# Patient Record
Sex: MALE | Race: WHITE | Employment: FULL TIME | URBAN - METROPOLITAN AREA
[De-identification: names, ages, dates, MRNs, and addresses within clinical notes are randomized per-mention and may not be internally consistent; named-entity substitution may affect disease eponyms.]

---

## 2023-12-11 NOTE — PROGRESS NOTES
Judgment: Judgment normal.       Assessment:      Diagnosis Orders   1. SOB (shortness of breath)  Full PFT Study With Bronchodilator      2. Preventative health care  PSA Screening    CBC with Auto Differential    Comprehensive Metabolic Panel    Hemoglobin A1C    Lipid Panel      3. Colon cancer screening  Fecal DNA Colorectal cancer screening (Cologuard)      4. Obstructive sleep apnea        5. Gastroesophageal reflux disease without esophagitis        6. Hyperlipidemia, unspecified hyperlipidemia type        7. Concentration deficit          Plan:     Colon cancer screening  Discussed colonoscopy and cologuard, pt interested in cologuard. - Fecal DNA Colorectal cancer screening (Cologuard)    SOB (shortness of breath)  - will get PFT to further evaluate and rule out asthma. - cough and deep breath exercises. - Avoid triggers that may exacerbate symptoms. Obstructive sleep apnea  - pt states stable, is not using cpap at night. Gastroesophageal reflux disease without esophagitis  - Stable: con't current tx plan  - Patient educated on avoiding trigger food/drinks such as caffeine, soda, chocolate, greasy/fatty, spicy foods.  - Sleep with head of bed elevated, avoid lying flat after eating and avoid restrictive clothing around waist.    Hyperlipidemia, unspecified hyperlipidemia type  - due for labs. - Lifestyle changes: Decrease fats, sugars, carbohydrates, and increase routine exercise, try to get 150 minutes of aerobic activity a week  - Foods that helps increase HDL include the following: Fish, nuts, flax, avocados, and legumes (such as soy, kidney beans, chickpeas). - Will cont with low fat/chol die    Concentration deficit  - discussed with pt, they will need to get official diagnosis by psychiatrist or psychologist. Once they have the official diagnosis and bring paperwork to our office to be scanned into media, then treatment can be discussed and started. - pt verbalized understanding.

## 2023-12-12 SDOH — HEALTH STABILITY: PHYSICAL HEALTH: ON AVERAGE, HOW MANY MINUTES DO YOU ENGAGE IN EXERCISE AT THIS LEVEL?: 60 MIN

## 2023-12-12 SDOH — HEALTH STABILITY: PHYSICAL HEALTH: ON AVERAGE, HOW MANY DAYS PER WEEK DO YOU ENGAGE IN MODERATE TO STRENUOUS EXERCISE (LIKE A BRISK WALK)?: 6 DAYS

## 2023-12-13 ENCOUNTER — OFFICE VISIT (OUTPATIENT)
Dept: FAMILY MEDICINE CLINIC | Age: 48
End: 2023-12-13
Payer: COMMERCIAL

## 2023-12-13 ENCOUNTER — HOSPITAL ENCOUNTER (OUTPATIENT)
Age: 48
Setting detail: SPECIMEN
Discharge: HOME | End: 2023-12-13

## 2023-12-13 VITALS
WEIGHT: 201 LBS | DIASTOLIC BLOOD PRESSURE: 78 MMHG | TEMPERATURE: 97.4 F | HEIGHT: 74 IN | SYSTOLIC BLOOD PRESSURE: 122 MMHG | HEART RATE: 62 BPM | BODY MASS INDEX: 25.8 KG/M2 | RESPIRATION RATE: 16 BRPM | OXYGEN SATURATION: 96 %

## 2023-12-13 DIAGNOSIS — E78.5 HYPERLIPIDEMIA, UNSPECIFIED HYPERLIPIDEMIA TYPE: ICD-10-CM

## 2023-12-13 DIAGNOSIS — Z00.00 PREVENTATIVE HEALTH CARE: ICD-10-CM

## 2023-12-13 DIAGNOSIS — G47.33 OBSTRUCTIVE SLEEP APNEA: ICD-10-CM

## 2023-12-13 DIAGNOSIS — R41.840 CONCENTRATION DEFICIT: ICD-10-CM

## 2023-12-13 DIAGNOSIS — K21.9 GASTROESOPHAGEAL REFLUX DISEASE WITHOUT ESOPHAGITIS: ICD-10-CM

## 2023-12-13 DIAGNOSIS — R06.02 SOB (SHORTNESS OF BREATH): Primary | ICD-10-CM

## 2023-12-13 DIAGNOSIS — Z12.11 COLON CANCER SCREENING: ICD-10-CM

## 2023-12-13 PROBLEM — M99.01 CERVICAL SEGMENT DYSFUNCTION: Status: ACTIVE | Noted: 2022-10-10

## 2023-12-13 PROBLEM — M99.04 SOMATIC DYSFUNCTION OF SACRAL REGION: Status: ACTIVE | Noted: 2022-10-10

## 2023-12-13 PROBLEM — K59.89 INTESTINAL DYSBIOSIS: Status: ACTIVE | Noted: 2022-12-13

## 2023-12-13 PROBLEM — R74.8 HIGH GAMMA GLUTAMYL TRANSFERASE (GGT): Status: ACTIVE | Noted: 2022-12-13

## 2023-12-13 PROBLEM — E63.0 ESSENTIAL FATTY ACID (EFA) DEFICIENCY: Status: ACTIVE | Noted: 2022-12-13

## 2023-12-13 PROBLEM — K40.90 REDUCIBLE RIGHT INGUINAL HERNIA: Status: RESOLVED | Noted: 2017-10-27 | Resolved: 2023-12-13

## 2023-12-13 PROBLEM — K40.90 REDUCIBLE RIGHT INGUINAL HERNIA: Status: ACTIVE | Noted: 2017-10-27

## 2023-12-13 LAB
BASOPHILS # BLD: 0.06 K/UL (ref 0–0.2)
BASOPHILS NFR BLD: 1 % (ref 0–2)
EOSINOPHIL # BLD: 0.32 K/UL (ref 0–0.44)
EOSINOPHILS RELATIVE PERCENT: 5 % (ref 1–4)
ERYTHROCYTE [DISTWIDTH] IN BLOOD BY AUTOMATED COUNT: 12.1 % (ref 11.8–14.4)
EST. AVERAGE GLUCOSE BLD GHB EST-MCNC: 105 MG/DL
HBA1C MFR BLD: 5.3 % (ref 4–6)
HCT VFR BLD AUTO: 47.7 % (ref 40.7–50.3)
HGB BLD-MCNC: 15.7 G/DL (ref 13–17)
IMM GRANULOCYTES # BLD AUTO: 0.03 K/UL (ref 0–0.3)
IMM GRANULOCYTES NFR BLD: 0 %
LYMPHOCYTES NFR BLD: 1.99 K/UL (ref 1.1–3.7)
LYMPHOCYTES RELATIVE PERCENT: 29 % (ref 24–43)
MCH RBC QN AUTO: 29.2 PG (ref 25.2–33.5)
MCHC RBC AUTO-ENTMCNC: 32.9 G/DL (ref 28.4–34.8)
MCV RBC AUTO: 88.7 FL (ref 82.6–102.9)
MONOCYTES NFR BLD: 0.54 K/UL (ref 0.1–1.2)
MONOCYTES NFR BLD: 8 % (ref 3–12)
NEUTROPHILS NFR BLD: 57 % (ref 36–65)
NEUTS SEG NFR BLD: 3.93 K/UL (ref 1.5–8.1)
NRBC BLD-RTO: 0 PER 100 WBC
PLATELET # BLD AUTO: 181 K/UL (ref 138–453)
PMV BLD AUTO: 11.8 FL (ref 8.1–13.5)
RBC # BLD AUTO: 5.38 M/UL (ref 4.21–5.77)
WBC OTHER # BLD: 6.9 K/UL (ref 3.5–11.3)

## 2023-12-13 PROCEDURE — 99205 OFFICE O/P NEW HI 60 MIN: CPT | Performed by: NURSE PRACTITIONER

## 2023-12-13 SDOH — ECONOMIC STABILITY: FOOD INSECURITY: WITHIN THE PAST 12 MONTHS, THE FOOD YOU BOUGHT JUST DIDN'T LAST AND YOU DIDN'T HAVE MONEY TO GET MORE.: NEVER TRUE

## 2023-12-13 SDOH — ECONOMIC STABILITY: FOOD INSECURITY: WITHIN THE PAST 12 MONTHS, YOU WORRIED THAT YOUR FOOD WOULD RUN OUT BEFORE YOU GOT MONEY TO BUY MORE.: NEVER TRUE

## 2023-12-13 SDOH — ECONOMIC STABILITY: HOUSING INSECURITY
IN THE LAST 12 MONTHS, WAS THERE A TIME WHEN YOU DID NOT HAVE A STEADY PLACE TO SLEEP OR SLEPT IN A SHELTER (INCLUDING NOW)?: NO

## 2023-12-13 SDOH — ECONOMIC STABILITY: INCOME INSECURITY: HOW HARD IS IT FOR YOU TO PAY FOR THE VERY BASICS LIKE FOOD, HOUSING, MEDICAL CARE, AND HEATING?: NOT HARD AT ALL

## 2023-12-13 ASSESSMENT — ENCOUNTER SYMPTOMS
COUGH: 0
SORE THROAT: 0
ABDOMINAL PAIN: 0
RHINORRHEA: 0
BACK PAIN: 0
SHORTNESS OF BREATH: 1
NAUSEA: 0
ABDOMINAL DISTENTION: 0
CHEST TIGHTNESS: 1
CONSTIPATION: 0
DIARRHEA: 0
COLOR CHANGE: 0

## 2023-12-13 ASSESSMENT — PATIENT HEALTH QUESTIONNAIRE - PHQ9
2. FEELING DOWN, DEPRESSED OR HOPELESS: 0
SUM OF ALL RESPONSES TO PHQ QUESTIONS 1-9: 0
SUM OF ALL RESPONSES TO PHQ QUESTIONS 1-9: 0
SUM OF ALL RESPONSES TO PHQ9 QUESTIONS 1 & 2: 0
SUM OF ALL RESPONSES TO PHQ QUESTIONS 1-9: 0
SUM OF ALL RESPONSES TO PHQ QUESTIONS 1-9: 0
1. LITTLE INTEREST OR PLEASURE IN DOING THINGS: 0

## 2023-12-14 LAB
ALBUMIN SERPL-MCNC: 4.6 G/DL (ref 3.5–5.2)
ALBUMIN/GLOB SERPL: 2 {RATIO} (ref 1–2.5)
ALP SERPL-CCNC: 56 U/L (ref 40–129)
ALT SERPL-CCNC: 30 U/L (ref 10–50)
ANION GAP SERPL CALCULATED.3IONS-SCNC: 9 MMOL/L (ref 9–16)
AST SERPL-CCNC: 40 U/L (ref 10–50)
BILIRUB SERPL-MCNC: 0.7 MG/DL (ref 0–1.2)
BUN SERPL-MCNC: 18 MG/DL (ref 6–20)
CALCIUM SERPL-MCNC: 9.5 MG/DL (ref 8.6–10.4)
CHLORIDE SERPL-SCNC: 102 MMOL/L (ref 98–107)
CHOLEST SERPL-MCNC: 170 MG/DL (ref 0–199)
CHOLESTEROL/HDL RATIO: 3
CO2 SERPL-SCNC: 29 MMOL/L (ref 20–31)
CREAT SERPL-MCNC: 1 MG/DL (ref 0.7–1.2)
GFR SERPL CREATININE-BSD FRML MDRD: >60 ML/MIN/1.73M2
GLUCOSE SERPL-MCNC: 84 MG/DL (ref 74–99)
HDLC SERPL-MCNC: 54 MG/DL
LDLC SERPL CALC-MCNC: 108 MG/DL (ref 0–100)
POTASSIUM SERPL-SCNC: 4.7 MMOL/L (ref 3.7–5.3)
PROT SERPL-MCNC: 7 G/DL (ref 6.6–8.7)
PSA SERPL-MCNC: 0.3 NG/ML (ref 0–4)
SODIUM SERPL-SCNC: 140 MMOL/L (ref 136–145)
TRIGL SERPL-MCNC: 42 MG/DL (ref 0–149)
VLDLC SERPL CALC-MCNC: 8 MG/DL

## 2023-12-30 LAB — NONINV COLON CA DNA+OCC BLD SCRN STL QL: NEGATIVE

## 2024-01-03 ENCOUNTER — HOSPITAL ENCOUNTER (OUTPATIENT)
Dept: PULMONOLOGY | Age: 49
Discharge: HOME | End: 2024-01-03
Payer: COMMERCIAL

## 2024-01-03 DIAGNOSIS — R06.02 SOB (SHORTNESS OF BREATH): ICD-10-CM

## 2024-01-03 PROCEDURE — 94726 PLETHYSMOGRAPHY LUNG VOLUMES: CPT

## 2024-01-03 PROCEDURE — 94729 DIFFUSING CAPACITY: CPT

## 2024-01-03 PROCEDURE — 94010 BREATHING CAPACITY TEST: CPT

## 2024-01-03 NOTE — PROCEDURES
[address] [address]                               PULMONARY FUNCTION    PATIENT NAME: CHIP GUTIERREZ                       :        1975  MED REC NO:   [id number]                             ROOM:  ACCOUNT NO:   [id number]                           ADMIT DATE: 2024  PROVIDER:     Theresa Evans    DATE OF PROCEDURE:  2024    Spirometry without any evidence of obstructive ventilatory defect and  has a FEV1 of 4.57 liters and FVC of 5.63 liters.    Lung volumes show a total lung capacity that is normal at 111%  predicted.    Diffusion capacity is normal at 110% predicted.    Airway resistance is decreased at _____% predicted.    Specific conductance is increased at 102% predicted.    Flow volume loop does not show any obstructive ventilatory defect.    IMPRESSION:  Normal pulmonary function test with an FEV1 of 4.57 liters  and FVC of 5.63 liters.        THERESA EVANS    D: 2024 12:44:12       T: 2024 14:07:29     [id number]  Job#: [id number]     Doc#: [id number]    CC:

## 2024-05-22 ENCOUNTER — TELEMEDICINE (OUTPATIENT)
Dept: FAMILY MEDICINE CLINIC | Age: 49
End: 2024-05-22
Payer: COMMERCIAL

## 2024-05-22 DIAGNOSIS — R06.02 SOB (SHORTNESS OF BREATH): Primary | ICD-10-CM

## 2024-05-22 DIAGNOSIS — G47.30 SLEEP APNEA IN ADULT: ICD-10-CM

## 2024-05-22 DIAGNOSIS — M25.521 RIGHT ELBOW PAIN: ICD-10-CM

## 2024-05-22 DIAGNOSIS — Z00.00 PREVENTATIVE HEALTH CARE: ICD-10-CM

## 2024-05-22 PROCEDURE — 99215 OFFICE O/P EST HI 40 MIN: CPT | Performed by: NURSE PRACTITIONER

## 2024-05-22 RX ORDER — ALBUTEROL SULFATE 90 UG/1
2 AEROSOL, METERED RESPIRATORY (INHALATION) 4 TIMES DAILY PRN
Qty: 54 G | Refills: 1 | Status: SHIPPED | OUTPATIENT
Start: 2024-05-22

## 2024-05-22 ASSESSMENT — ENCOUNTER SYMPTOMS
COLOR CHANGE: 0
ABDOMINAL DISTENTION: 0
CONSTIPATION: 0
COUGH: 0
DIARRHEA: 0
ABDOMINAL PAIN: 0
CHEST TIGHTNESS: 0
NAUSEA: 0
SHORTNESS OF BREATH: 1
RHINORRHEA: 0
SORE THROAT: 0
BACK PAIN: 0

## 2024-05-22 NOTE — PROGRESS NOTES
[place], MITCHELL-CNP  [facility] MEDICINE  [address], [address]  [facility] [address]  Dept: [phone]  Dept Fax: [phone]      Khoa Neal, was evaluated through a synchronous (real-time) audio-video encounter. The patient (or guardian if applicable) is aware that this is a billable service, which includes applicable co-pays. This Virtual Visit was conducted with patient's (and/or legal guardian's) consent. Patient identification was verified, and a caregiver was present when appropriate.   The patient was located at Home: [address] [place] [address] [place] [address]  Provider was located at Facility (Appt Dept): [address], [address]  [place] [address]  Confirm you are appropriately licensed, registered, or certified to deliver care in the state where the patient is located as indicated above. If you are not or unsure, please re-schedule the visit: Yes, I confirm.     Khoa Neal (:  1975) is a Established patient, presenting virtually for evaluation of the following:    No changes since last visit,he notice every once in a while he tends to feel like he can't catch his breath. He can usually run a mile but anything more he gets SOB. Does get some chest tightness at times but he has had all cardiac workup at [facility] and everything has been cleared with heart. He doesn't  really notice if stress is a trigger, he used to get panic attacks in the past. It has been a while to do a sleep study maybe , he felt the cpap made his symptoms worse.     He is having pain that starts in his right elbow and radiating up to the forearm. It has been ongoing for a few months and mainly feel it with certain movements and able to still lift weights and get through it. It is more tender to the outer part of the elbow.   During tax season     Assessment & Plan   Below is the assessment and plan developed based on review of pertinent history, physical exam, labs,

## 2024-07-02 ENCOUNTER — HOSPITAL ENCOUNTER (OUTPATIENT)
Dept: SLEEP CENTER | Age: 49
Discharge: HOME | End: 2024-07-04
Payer: COMMERCIAL

## 2024-07-02 VITALS
RESPIRATION RATE: 12 BRPM | OXYGEN SATURATION: 94 % | HEART RATE: 58 BPM | BODY MASS INDEX: 25.8 KG/M2 | WEIGHT: 201 LBS | HEIGHT: 74 IN

## 2024-07-02 DIAGNOSIS — G47.30 SLEEP APNEA IN ADULT: ICD-10-CM

## 2024-07-02 PROCEDURE — 95811 POLYSOM 6/>YRS CPAP 4/> PARM: CPT

## 2024-07-02 ASSESSMENT — SLEEP AND FATIGUE QUESTIONNAIRES
HOW LIKELY ARE YOU TO NOD OFF OR FALL ASLEEP WHILE SITTING AND TALKING TO SOMEONE: WOULD NEVER DOZE
HOW LIKELY ARE YOU TO NOD OFF OR FALL ASLEEP WHILE SITTING INACTIVE IN A PUBLIC PLACE: WOULD NEVER DOZE
HOW LIKELY ARE YOU TO NOD OFF OR FALL ASLEEP IN A CAR, WHILE STOPPED FOR A FEW MINUTES IN TRAFFIC: WOULD NEVER DOZE
HOW LIKELY ARE YOU TO NOD OFF OR FALL ASLEEP WHILE LYING DOWN TO REST IN THE AFTERNOON WHEN CIRCUMSTANCES PERMIT: MODERATE CHANCE OF DOZING
ESS TOTAL SCORE: 7
HOW LIKELY ARE YOU TO NOD OFF OR FALL ASLEEP WHILE WATCHING TV: MODERATE CHANCE OF DOZING
HOW LIKELY ARE YOU TO NOD OFF OR FALL ASLEEP WHILE SITTING AND READING: SLIGHT CHANCE OF DOZING
HOW LIKELY ARE YOU TO NOD OFF OR FALL ASLEEP WHILE SITTING QUIETLY AFTER LUNCH WITHOUT ALCOHOL: MODERATE CHANCE OF DOZING
HOW LIKELY ARE YOU TO NOD OFF OR FALL ASLEEP WHEN YOU ARE A PASSENGER IN A CAR FOR AN HOUR WITHOUT A BREAK: WOULD NEVER DOZE

## 2024-07-03 NOTE — DISCHARGE INSTRUCTIONS
DME: No preference  Mask: Dreamwear, full face  Size: Medium cushion/ Medium frame  Bpap: TiMn- 0.3             TiMx- 2.0             Rise- Easy Breathe On

## 2024-07-09 LAB — STATUS: NORMAL

## 2024-07-10 DIAGNOSIS — G47.33 OSA (OBSTRUCTIVE SLEEP APNEA): Primary | ICD-10-CM

## 2024-07-27 ENCOUNTER — HOSPITAL ENCOUNTER (INPATIENT)
Age: 49
LOS: 1 days | Discharge: HOME | DRG: 309 | End: 2024-07-29
Attending: EMERGENCY MEDICINE
Payer: COMMERCIAL

## 2024-07-27 ENCOUNTER — APPOINTMENT (OUTPATIENT)
Dept: GENERAL RADIOLOGY | Age: 49
DRG: 309 | End: 2024-07-27
Payer: COMMERCIAL

## 2024-07-27 DIAGNOSIS — I48.92 ATRIAL FLUTTER, UNSPECIFIED TYPE (HCC): Primary | ICD-10-CM

## 2024-07-27 LAB
BASOPHILS # BLD: 0.06 K/UL (ref 0–0.2)
BASOPHILS NFR BLD: 1 % (ref 0–2)
EOSINOPHIL # BLD: 0.3 K/UL (ref 0–0.44)
EOSINOPHILS RELATIVE PERCENT: 3 % (ref 1–4)
ERYTHROCYTE [DISTWIDTH] IN BLOOD BY AUTOMATED COUNT: 12.3 % (ref 11.8–14.4)
HCT VFR BLD AUTO: 48.1 % (ref 40.7–50.3)
HGB BLD-MCNC: 15.6 G/DL (ref 13–17)
IMM GRANULOCYTES # BLD AUTO: <0.03 K/UL (ref 0–0.3)
IMM GRANULOCYTES NFR BLD: 0 %
LYMPHOCYTES NFR BLD: 2.75 K/UL (ref 1.1–3.7)
LYMPHOCYTES RELATIVE PERCENT: 31 % (ref 24–43)
MCH RBC QN AUTO: 28.8 PG (ref 25.2–33.5)
MCHC RBC AUTO-ENTMCNC: 32.4 G/DL (ref 28.4–34.8)
MCV RBC AUTO: 88.9 FL (ref 82.6–102.9)
MONOCYTES NFR BLD: 0.76 K/UL (ref 0.1–1.2)
MONOCYTES NFR BLD: 9 % (ref 3–12)
NEUTROPHILS NFR BLD: 56 % (ref 36–65)
NEUTS SEG NFR BLD: 5.02 K/UL (ref 1.5–8.1)
NRBC BLD-RTO: 0 PER 100 WBC
PLATELET # BLD AUTO: 169 K/UL (ref 138–453)
PMV BLD AUTO: 11.6 FL (ref 8.1–13.5)
RBC # BLD AUTO: 5.41 M/UL (ref 4.21–5.77)
WBC OTHER # BLD: 8.9 K/UL (ref 3.5–11.3)

## 2024-07-27 PROCEDURE — 99285 EMERGENCY DEPT VISIT HI MDM: CPT

## 2024-07-27 PROCEDURE — 83880 ASSAY OF NATRIURETIC PEPTIDE: CPT

## 2024-07-27 PROCEDURE — 86140 C-REACTIVE PROTEIN: CPT

## 2024-07-27 PROCEDURE — 96376 TX/PRO/DX INJ SAME DRUG ADON: CPT

## 2024-07-27 PROCEDURE — 83036 HEMOGLOBIN GLYCOSYLATED A1C: CPT

## 2024-07-27 PROCEDURE — 84443 ASSAY THYROID STIM HORMONE: CPT

## 2024-07-27 PROCEDURE — 96365 THER/PROPH/DIAG IV INF INIT: CPT

## 2024-07-27 PROCEDURE — 80061 LIPID PANEL: CPT

## 2024-07-27 PROCEDURE — 82306 VITAMIN D 25 HYDROXY: CPT

## 2024-07-27 PROCEDURE — 85652 RBC SED RATE AUTOMATED: CPT

## 2024-07-27 PROCEDURE — 85025 COMPLETE CBC W/AUTO DIFF WBC: CPT

## 2024-07-27 PROCEDURE — 71045 X-RAY EXAM CHEST 1 VIEW: CPT

## 2024-07-27 PROCEDURE — 96366 THER/PROPH/DIAG IV INF ADDON: CPT

## 2024-07-27 PROCEDURE — 80053 COMPREHEN METABOLIC PANEL: CPT

## 2024-07-27 PROCEDURE — 84484 ASSAY OF TROPONIN QUANT: CPT

## 2024-07-27 PROCEDURE — 93005 ELECTROCARDIOGRAM TRACING: CPT | Performed by: INTERNAL MEDICINE

## 2024-07-27 PROCEDURE — 2500000003 HC RX 250 WO HCPCS: Performed by: EMERGENCY MEDICINE

## 2024-07-27 PROCEDURE — 85379 FIBRIN DEGRADATION QUANT: CPT

## 2024-07-27 PROCEDURE — 2580000003 HC RX 258: Performed by: EMERGENCY MEDICINE

## 2024-07-27 RX ORDER — DILTIAZEM HYDROCHLORIDE 5 MG/ML
20 INJECTION INTRAVENOUS ONCE
Status: COMPLETED | OUTPATIENT
Start: 2024-07-27 | End: 2024-07-27

## 2024-07-27 RX ORDER — DILTIAZEM HYDROCHLORIDE 5 MG/ML
10 INJECTION INTRAVENOUS ONCE
Status: DISCONTINUED | OUTPATIENT
Start: 2024-07-27 | End: 2024-07-27

## 2024-07-27 RX ORDER — 0.9 % SODIUM CHLORIDE 0.9 %
1000 INTRAVENOUS SOLUTION INTRAVENOUS ONCE
Status: COMPLETED | OUTPATIENT
Start: 2024-07-27 | End: 2024-07-28

## 2024-07-27 RX ADMIN — DILTIAZEM HYDROCHLORIDE 2.5 MG/HR: 5 INJECTION INTRAVENOUS at 23:44

## 2024-07-27 RX ADMIN — SODIUM CHLORIDE 1000 ML: 9 INJECTION, SOLUTION INTRAVENOUS at 23:21

## 2024-07-27 RX ADMIN — DILTIAZEM HYDROCHLORIDE 20 MG: 5 INJECTION, SOLUTION INTRAVENOUS at 23:36

## 2024-07-27 ASSESSMENT — LIFESTYLE VARIABLES
HOW OFTEN DO YOU HAVE A DRINK CONTAINING ALCOHOL: NEVER
HOW MANY STANDARD DRINKS CONTAINING ALCOHOL DO YOU HAVE ON A TYPICAL DAY: PATIENT DOES NOT DRINK

## 2024-07-28 PROBLEM — I48.92 ATRIAL FLUTTER (HCC): Status: ACTIVE | Noted: 2024-07-28

## 2024-07-28 PROBLEM — I95.9 ARTERIAL HYPOTENSION: Status: ACTIVE | Noted: 2024-07-28

## 2024-07-28 PROBLEM — R79.89 ELEVATED TROPONIN: Status: ACTIVE | Noted: 2024-07-28

## 2024-07-28 LAB
25(OH)D3 SERPL-MCNC: 30.8 NG/ML (ref 30–100)
ALBUMIN SERPL-MCNC: 4.6 G/DL (ref 3.5–5.2)
ALBUMIN/GLOB SERPL: 2 {RATIO} (ref 1–2.5)
ALP SERPL-CCNC: 65 U/L (ref 40–129)
ALT SERPL-CCNC: 18 U/L (ref 10–50)
ANION GAP SERPL CALCULATED.3IONS-SCNC: 12 MMOL/L (ref 9–16)
AST SERPL-CCNC: 34 U/L (ref 10–50)
BILIRUB SERPL-MCNC: 0.3 MG/DL (ref 0–1.2)
BILIRUB UR QL STRIP: NEGATIVE
BNP SERPL-MCNC: 569 PG/ML (ref 0–300)
BUN SERPL-MCNC: 26 MG/DL (ref 6–20)
CALCIUM SERPL-MCNC: 9.9 MG/DL (ref 8.6–10.4)
CHLORIDE SERPL-SCNC: 103 MMOL/L (ref 98–107)
CHOLEST SERPL-MCNC: 180 MG/DL (ref 0–199)
CHOLESTEROL/HDL RATIO: 4
CLARITY UR: CLEAR
CO2 SERPL-SCNC: 26 MMOL/L (ref 20–31)
COLOR UR: YELLOW
COMMENT: NORMAL
CREAT SERPL-MCNC: 1.4 MG/DL (ref 0.7–1.2)
CRP SERPL HS-MCNC: <3 MG/L (ref 0–5)
D DIMER PPP FEU-MCNC: 0.32 UG/ML FEU (ref 0–0.57)
ERYTHROCYTE [SEDIMENTATION RATE] IN BLOOD BY PHOTOMETRIC METHOD: 1 MM/HR (ref 0–15)
EST. AVERAGE GLUCOSE BLD GHB EST-MCNC: 105 MG/DL
GFR, ESTIMATED: 64 ML/MIN/1.73M2
GLUCOSE SERPL-MCNC: 87 MG/DL (ref 74–99)
GLUCOSE UR STRIP-MCNC: NEGATIVE MG/DL
HBA1C MFR BLD: 5.3 % (ref 4–6)
HDLC SERPL-MCNC: 46 MG/DL
HGB UR QL STRIP.AUTO: NEGATIVE
KETONES UR STRIP-MCNC: NEGATIVE MG/DL
LDLC SERPL CALC-MCNC: 104 MG/DL (ref 0–100)
LEUKOCYTE ESTERASE UR QL STRIP: NEGATIVE
NITRITE UR QL STRIP: NEGATIVE
PH UR STRIP: 6 [PH] (ref 5–8)
POTASSIUM SERPL-SCNC: 4.2 MMOL/L (ref 3.7–5.3)
PROT SERPL-MCNC: 6.9 G/DL (ref 6.6–8.7)
PROT UR STRIP-MCNC: NEGATIVE MG/DL
SODIUM SERPL-SCNC: 141 MMOL/L (ref 136–145)
SP GR UR STRIP: 1.02 (ref 1–1.03)
TRIGL SERPL-MCNC: 150 MG/DL
TROPONIN I SERPL HS-MCNC: 27 NG/L (ref 0–22)
TROPONIN I SERPL HS-MCNC: 29 NG/L (ref 0–22)
TSH SERPL DL<=0.05 MIU/L-ACNC: 2.05 UIU/ML (ref 0.27–4.2)
UROBILINOGEN UR STRIP-ACNC: NORMAL EU/DL (ref 0–1)
VLDLC SERPL CALC-MCNC: 30 MG/DL

## 2024-07-28 PROCEDURE — 6360000002 HC RX W HCPCS: Performed by: NURSE PRACTITIONER

## 2024-07-28 PROCEDURE — 99223 1ST HOSP IP/OBS HIGH 75: CPT | Performed by: INTERNAL MEDICINE

## 2024-07-28 PROCEDURE — 2580000003 HC RX 258: Performed by: NURSE PRACTITIONER

## 2024-07-28 PROCEDURE — 2580000003 HC RX 258

## 2024-07-28 PROCEDURE — 6360000002 HC RX W HCPCS

## 2024-07-28 PROCEDURE — 99222 1ST HOSP IP/OBS MODERATE 55: CPT | Performed by: INTERNAL MEDICINE

## 2024-07-28 PROCEDURE — 6370000000 HC RX 637 (ALT 250 FOR IP): Performed by: NURSE PRACTITIONER

## 2024-07-28 PROCEDURE — 84484 ASSAY OF TROPONIN QUANT: CPT

## 2024-07-28 PROCEDURE — 6370000000 HC RX 637 (ALT 250 FOR IP): Performed by: INTERNAL MEDICINE

## 2024-07-28 PROCEDURE — 81003 URINALYSIS AUTO W/O SCOPE: CPT

## 2024-07-28 PROCEDURE — 2060000000 HC ICU INTERMEDIATE R&B

## 2024-07-28 RX ORDER — ENOXAPARIN SODIUM 100 MG/ML
1 INJECTION SUBCUTANEOUS 2 TIMES DAILY
Status: DISCONTINUED | OUTPATIENT
Start: 2024-07-28 | End: 2024-07-29 | Stop reason: HOSPADM

## 2024-07-28 RX ORDER — ACETAMINOPHEN 325 MG/1
650 TABLET ORAL EVERY 6 HOURS PRN
Status: DISCONTINUED | OUTPATIENT
Start: 2024-07-28 | End: 2024-07-29 | Stop reason: HOSPADM

## 2024-07-28 RX ORDER — SODIUM CHLORIDE 0.9 % (FLUSH) 0.9 %
10 SYRINGE (ML) INJECTION PRN
Status: DISCONTINUED | OUTPATIENT
Start: 2024-07-28 | End: 2024-07-29 | Stop reason: HOSPADM

## 2024-07-28 RX ORDER — SODIUM CHLORIDE 9 MG/ML
INJECTION, SOLUTION INTRAVENOUS PRN
Status: DISCONTINUED | OUTPATIENT
Start: 2024-07-28 | End: 2024-07-29 | Stop reason: HOSPADM

## 2024-07-28 RX ORDER — ACETAMINOPHEN 650 MG/1
650 SUPPOSITORY RECTAL EVERY 6 HOURS PRN
Status: DISCONTINUED | OUTPATIENT
Start: 2024-07-28 | End: 2024-07-29 | Stop reason: HOSPADM

## 2024-07-28 RX ORDER — FAMOTIDINE 20 MG/1
20 TABLET, FILM COATED ORAL DAILY
Status: DISCONTINUED | OUTPATIENT
Start: 2024-07-28 | End: 2024-07-29 | Stop reason: HOSPADM

## 2024-07-28 RX ORDER — 0.9 % SODIUM CHLORIDE 0.9 %
1000 INTRAVENOUS SOLUTION INTRAVENOUS ONCE
Status: COMPLETED | OUTPATIENT
Start: 2024-07-28 | End: 2024-07-28

## 2024-07-28 RX ORDER — SODIUM CHLORIDE 0.9 % (FLUSH) 0.9 %
5-40 SYRINGE (ML) INJECTION EVERY 12 HOURS SCHEDULED
Status: DISCONTINUED | OUTPATIENT
Start: 2024-07-28 | End: 2024-07-29 | Stop reason: HOSPADM

## 2024-07-28 RX ORDER — ONDANSETRON 2 MG/ML
4 INJECTION INTRAMUSCULAR; INTRAVENOUS EVERY 6 HOURS PRN
Status: DISCONTINUED | OUTPATIENT
Start: 2024-07-28 | End: 2024-07-29 | Stop reason: HOSPADM

## 2024-07-28 RX ORDER — POLYETHYLENE GLYCOL 3350 17 G/17G
17 POWDER, FOR SOLUTION ORAL DAILY PRN
Status: DISCONTINUED | OUTPATIENT
Start: 2024-07-28 | End: 2024-07-29 | Stop reason: HOSPADM

## 2024-07-28 RX ORDER — ONDANSETRON 4 MG/1
4 TABLET, ORALLY DISINTEGRATING ORAL EVERY 8 HOURS PRN
Status: DISCONTINUED | OUTPATIENT
Start: 2024-07-28 | End: 2024-07-29 | Stop reason: HOSPADM

## 2024-07-28 RX ORDER — SODIUM CHLORIDE 9 MG/ML
INJECTION, SOLUTION INTRAVENOUS CONTINUOUS
Status: DISCONTINUED | OUTPATIENT
Start: 2024-07-28 | End: 2024-07-29 | Stop reason: HOSPADM

## 2024-07-28 RX ADMIN — METOPROLOL TARTRATE 12.5 MG: 25 TABLET ORAL at 14:11

## 2024-07-28 RX ADMIN — SODIUM CHLORIDE: 9 INJECTION, SOLUTION INTRAVENOUS at 19:04

## 2024-07-28 RX ADMIN — ENOXAPARIN SODIUM 90 MG: 100 INJECTION SUBCUTANEOUS at 20:37

## 2024-07-28 RX ADMIN — ENOXAPARIN SODIUM 90 MG: 100 INJECTION SUBCUTANEOUS at 09:46

## 2024-07-28 RX ADMIN — AMIODARONE HYDROCHLORIDE 1 MG/MIN: 50 INJECTION, SOLUTION INTRAVENOUS at 10:43

## 2024-07-28 RX ADMIN — ACETAMINOPHEN 650 MG: 325 TABLET ORAL at 20:38

## 2024-07-28 RX ADMIN — AMIODARONE HYDROCHLORIDE 1 MG/MIN: 50 INJECTION, SOLUTION INTRAVENOUS at 02:33

## 2024-07-28 RX ADMIN — METOPROLOL TARTRATE 12.5 MG: 25 TABLET ORAL at 20:38

## 2024-07-28 RX ADMIN — FAMOTIDINE 20 MG: 20 TABLET, FILM COATED ORAL at 09:46

## 2024-07-28 RX ADMIN — SODIUM CHLORIDE: 9 INJECTION, SOLUTION INTRAVENOUS at 04:43

## 2024-07-28 RX ADMIN — SODIUM CHLORIDE 1000 ML: 9 INJECTION, SOLUTION INTRAVENOUS at 01:21

## 2024-07-28 RX ADMIN — SODIUM CHLORIDE, PRESERVATIVE FREE 10 ML: 5 INJECTION INTRAVENOUS at 20:38

## 2024-07-28 ASSESSMENT — PAIN SCALES - WONG BAKER: WONGBAKER_NUMERICALRESPONSE: NO HURT

## 2024-07-28 ASSESSMENT — PAIN SCALES - GENERAL
PAINLEVEL_OUTOF10: 0
PAINLEVEL_OUTOF10: 0
PAINLEVEL_OUTOF10: 6
PAINLEVEL_OUTOF10: 0

## 2024-07-28 ASSESSMENT — PAIN - FUNCTIONAL ASSESSMENT: PAIN_FUNCTIONAL_ASSESSMENT: NONE - DENIES PAIN

## 2024-07-28 ASSESSMENT — PAIN DESCRIPTION - DESCRIPTORS: DESCRIPTORS: DISCOMFORT;ACHING

## 2024-07-28 ASSESSMENT — PAIN DESCRIPTION - LOCATION: LOCATION: BACK;NECK

## 2024-07-28 NOTE — ED NOTES
ED to inpatient nurses report      Chief Complaint:  Chief Complaint   Patient presents with    Tachycardia     150's    Dizziness     Present to ED from: Home    MOA:   Patient to ED as a walk-in from triage for evaluation of Tachycardia HR is 150's on the cardiac monitor, Lightheadedness PTA. Patient states he took caffeine drink and mixed it with water before he starts to work out. Patient states when his doing cardio under the treadmill he feels like his heart is raising and lightheaded. Stopped the workout and went home and went straight to ER for evaluation. Patient states he had the same situation back then and he went to [facility] and was evaluated. Denies Chest pain, SOB. No known cardiac history, no DM, COPD or asthma. Connected to full cardiac monitor and life pack, IV established by Petra CHAIDEZ, EKG done. Call light in reach. Patient is resting comfortably, NAD, Tachycardia on cardiac monitor, Call light in reach. Plan of care on going.     LOC: alert and orientated to name, place, date  Mobility: Independent  Oxygen Baseline: 94% on RA    Current needs required: Currently on Lifepak and amiodarone drip   Pending ED orders: Admit orders  Present condition: Stable appears to be Sinus tachycardia on monitor    Why did the patient come to the ED? Chest pounding and lightheadedness  What is the plan? Admit, see orders for reference  Any procedures or intervention occur? Labs and imaging  Any safety concerns?? No    Mental Status:  Level of Consciousness: Alert (0)    Psych Assessment:   Psychosocial  Psychosocial (WDL): Within Defined Limits  Vital signs   Vitals:    07/28/24 0210 07/28/24 0235 07/28/24 0240 07/28/24 0241   BP:  101/77 (!) 110/97    Pulse: (!) 148 (!) 146 (!) 149 (!) 148   Resp: 27 28 14 14   Temp:       TempSrc:       SpO2:  95% 92% 94%   Weight:            Vitals:  Patient Vitals for the past 24 hrs:   BP Temp Temp src Pulse Resp SpO2 Weight   07/28/24 0241 -- -- -- (!) 148 14 94 %    COMPREHENSIVE METABOLIC PANEL - Abnormal; Notable for the following components:       Result Value    BUN 26 (*)     Creatinine 1.4 (*)     All other components within normal limits   TROPONIN - Abnormal; Notable for the following components:    Troponin, High Sensitivity 27 (*)     All other components within normal limits   TROPONIN - Abnormal; Notable for the following components:    Troponin, High Sensitivity 29 (*)     All other components within normal limits   BRAIN NATRIURETIC PEPTIDE - Abnormal; Notable for the following components:    Pro- (*)     All other components within normal limits   CBC WITH AUTO DIFFERENTIAL   TSH WITH REFLEX   D-DIMER, QUANTITATIVE       Electronically signed by Edgar Valente RN on 7/28/2024 at 2:43 AM

## 2024-07-28 NOTE — ED NOTES
Patient to ED as a walk-in from triage for evaluation of Tachycardia HR is 150's on the cardiac monitor, Lightheadedness PTA. Patient states he took caffeine drink and mixed it with water before he starts to work out. Patient states when his doing cardio under the treadmill he feels like his heart is raising and lightheaded. Stopped the workout and went home and went straight to ER for evaluation. Patient states he had the same situation back then and he went to [facility] and was evaluated. Denies Chest pain, SOB. No known cardiac history, no DM, COPD or asthma. Connected to full cardiac monitor and life pack, IV established by Petra CHAIDEZ, EKG done. Call light in reach. Patient is resting comfortably, NAD, Tachycardia on cardiac monitor, Call light in reach. Plan of care on going.

## 2024-07-28 NOTE — ED PROVIDER NOTES
[facility] ED     Emergency Department     Faculty Attestation        I performed a history and physical examination of the patient and discussed management with the resident. I reviewed the resident’s note and agree with the documented findings and plan of care. Any areas of disagreement are noted on the chart. I was personally present for the key portions of any procedures. I have documented in the chart those procedures where I was not present during the key portions. I have reviewed the emergency nurses triage note. I agree with the chief complaint, past medical history, past surgical history, allergies, medications, social and family history as documented unless otherwise noted below.    For mid-level providers such as nurse practitioners as well as physicians assistants:    I have personally seen and evaluated the patient.    I find the patient's history and physical exam are consistent with NP/PA documentation.  I agree with the care provided, treatment rendered, disposition, & follow-up plan.     Additional findings are as noted.    Vital Signs: BP 94/66   Pulse 80   Temp 98 °F (36.7 °C) (Oral)   Resp 17   Wt 86.2 kg (190 lb)   SpO2 96%   BMI 24.39 kg/m²   PCP:  [place] APRN - CNP    Pertinent Comments:     Patient presents with palpitations and feeling dizzy and lightheaded.  He had episode of this similar to the this when he passed out during working out.  He was seen at [facility] and had extensive negative workup.    He states today he has felt like his heart is racing feels lightheaded especially when he stands out and generally weak.  He denies any cardiac history.  Does have a history of sleep apnea but no history of diabetes hypertension or any heart problems in the past.  He denies any excessive caffeine or stimulant use.  No drugs or alcohol use.    Exam he is resting comfortably no acute distress regular rate and rhythm,

## 2024-07-28 NOTE — PROGRESS NOTES
New admission from ED to room 2008.Patient AOX4, saturating on room air. Placed on continuous cardiac monitor with 's on A.flutter.Ongoing Amiodarone gtt running at 1mg/min.Initial vital signs and assessment obtained.No complaints of shortness of breath nor chest pain. Oriented to room and call light use.Notified on-call provider regarding heart rate on 150's.Care ongoing.

## 2024-07-28 NOTE — ED NOTES
Patient appears to be on sinus tachycardia on cardiac monitor, Placed patient on life pack for safety measure and notify Dr. Contreras for consult and evaluation and is currently at bedside.

## 2024-07-28 NOTE — H&P
[facility]  Office: [phone]  Johnathon Cruz DO, Eduardo Richard DO, Antonio Galicia DO, Gabriel Campbell DO, Jayesh Kelly MD, Mirta Pastor MD, Lorraine Dykes MD, Yin Garcia MD,  Lexa Licona MD, Aretha Blood MD, Maribell Helm MD,  Gillian Douglas DO, Sabrina Bhandari MD, Yobany Quintero MD, Leo Cruz DO, Mariya Grande MD,  Oscar Bergeron DO, Miladis Garrido MD, Nehal Grey MD, Simona Alcala MD, Luis Pierre MD,  Rafi Costa MD, Fatuma Santana MD, Wisam Akins MD, Adonis Gastelum MD, Cristino Christianson MD, Betty Neff MD, Elian Torres DO, Victorino Leonardo DO, Alan Cowan MD,  Fan Swift MD, Shirley Waterhouse, CNP,  Jaquelin Avery CNP, Eran Lopez, CNP,  Nusrat Mcclelland, CATY, Ana Paula Rich, CNP, Sabra Tabares, CNP, Breanna Golden CNP, Yvette Cota, CNP, Kathy Christianson, PA-C, Iqra Alcala PA-C, Lachelle Mcmahon, CNP, Judith Hernandez, CNP, Milena Richards, CNP, Digna Merritt, CNP, Rhea Chauhan CNP, Erin Bernard, CNS, Bhargavi Ferrara, CNP, Margaret Maddox CNP, Tracy Schwab, CNP         [facility]   IN-PATIENT SERVICE   [facility]    HISTORY AND PHYSICAL EXAMINATION            Date:   7/28/2024  Patient name:  Khoa Neal  Date of admission:  7/27/2024 11:02 PM  MRN:   [id number]  Account:  [id number]  YOB: 1975  PCP:    [place] APRN - CNP  Room:   2008/2008-01  Code Status:    Full Code    Chief Complaint:     Chief Complaint   Patient presents with    Tachycardia     150's    Dizziness   \" Heart was racing\"    History Obtained From:     patient    History of Present Illness:     Khoa Neal is a 49 y.o.  male w/ OA/LBP/DJD, chronic sinusitis w/ ALIS (s/p recent sleep study and recommended for BIPAP but pending f/u with pulmonary to discuss) who presents with Tachycardia (150's) and Dizziness   and is admitted to the hospital for the management of Atrial flutter (HCC).    Patient describes symptoms that began  Previously recommended for BiPAP.  Follow-up with pulmonary to evaluate.  Discussed with patient sleep apnea association with A-fib/flutter/arrhythmias.  Encourage compliance   Chronic sinusitis with history of seasonal allergies.  Possibly contributing to obstructive apnea.  Consideration for further outpatient ENT evaluation discussed with patient.  Denies any acute symptoms  Chronic GERD start GI prophylaxis while on anticoagulation  Chronic lumbago/osteoarthritis.  Check ESR, CRP, vitamin D levels with persistent symptoms.      Labs and prior records, chart reviewed independently by myself.  Question concerns and treatment plan discussed with patient and wife.  Consideration for possible cardioversion discussed pending further workup.     Prior records reviewed reviewed independently by myself        Consultations:   IP CONSULT TO CARDIOLOGY  IP CONSULT TO HOSPITALIST     Patient is admitted as inpatient status because of co-morbidities listed above, severity of signs and symptoms as outlined, requirement for current medical therapies and most importantly because of direct risk to patient if care not provided in a hospital setting.  Expected length of stay > 48 hours.    Mariya Grande MD  7/28/2024  6:45 AM    Copy sent to Mana Villegas APRN - CNP

## 2024-07-28 NOTE — ED PROVIDER NOTES
[facility] ED  Emergency Department Encounter  Emergency Medicine Resident     Pt Name:Khoa Neal  MRN: [id number]  Birthdate 1975  Date of evaluation: 7/27/24  PCP:  [place] APRN - CNP  Note Started: 11:33 PM EDT      CHIEF COMPLAINT       Chief Complaint   Patient presents with    Tachycardia     150's    Dizziness       HISTORY OF PRESENT ILLNESS  (Location/Symptom, Timing/Onset, Context/Setting, Quality, Duration, Modifying Factors, Severity.)      Khoa Neal is a 49 y.o. male who presents to the ER for evaluation of Tachycardia that started 1:00 PM. HR is 150's on the cardiac monitor, Lightheadedness. Patient states he took caffeine drink and mixed it with water before he starts to work out. Patient states when his doing cardio under the treadmill he feels like his heart is raising and lightheaded. Stopped the workout and went home and went straight to ER for evaluation. Patient states he had the same situation back in 2016 and he went to [facility] and was evaluated. All his workup came normal at that time. Denies Chest pain, SOB. Denies any use of drugs or alcoholic drinks.      No known cardiac history, no DM, COPD or asthma. Pt states that he has sleep apnea, but he does not use oxygen.  PAST MEDICAL / SURGICAL / SOCIAL / FAMILY HISTORY      has a past medical history of ADHD (attention deficit hyperactivity disorder), Anxiety, Chronic back pain, and Osteoarthritis.       has a past surgical history that includes Tonsillectomy (01/01/1981).      Social History     Socioeconomic History    Marital status:      Spouse name: Not on file    Number of children: Not on file    Years of education: Not on file    Highest education level: Not on file   Occupational History    Not on file   Tobacco Use    Smoking status: Never    Smokeless tobacco: Never   Substance and Sexual Activity    Alcohol use: Not Currently    Drug use: Never    Sexual activity: Yes

## 2024-07-28 NOTE — CARE COORDINATION
Case Management Assessment  Initial Evaluation    Date/Time of Evaluation: 7/28/2024 12:28 PM  Assessment Completed by: Rosalba Alvarez RN    If patient is discharged prior to next notation, then this note serves as note for discharge by case management.    Patient Name: Khoa Neal                   YOB: 1975  Diagnosis: Atrial flutter (HCC) [I48.92]                   Date / Time: 7/27/2024 11:02 PM    Patient Admission Status: Inpatient   Readmission Risk (Low < 19, Mod (19-27), High > 27): Readmission Risk Score: 5.7    Current PCP: [place] APRN - CNP  PCP verified by CM? (P) Yes ([place])    Chart Reviewed: Yes      History Provided by: (P) Patient  Patient Orientation: (P) Alert and Oriented, Person, Place, Situation, Self    Patient Cognition: (P) Alert    Hospitalization in the last 30 days (Readmission):  No    If yes, Readmission Assessment in CM Navigator will be completed.    Advance Directives:      Code Status: Full Code   Patient's Primary Decision Maker is: (P) Legal Next of Kin      Discharge Planning:    Patient lives with: (P) Spouse/Significant Other, Children Type of Home: (P) House  Primary Care Giver: (P) Self  Patient Support Systems include: (P) Spouse/Significant Other, Children   Current Financial resources: (P) Other (Comment) ([facility])  Current community resources: (P) None  Current services prior to admission: (P) None            Current DME:              Type of Home Care services:  (P) None    ADLS  Prior functional level: (P) Independent in ADLs/IADLs  Current functional level: (P) Independent in ADLs/IADLs    PT AM-PAC:   /24  OT AM-PAC:   /24    Family can provide assistance at DC: (P) Yes  Would you like Case Management to discuss the discharge plan with any other family members/significant others, and if so, who? (P) Yes (wife)  Plans to Return to Present Housing: (P) Yes  Other Identified Issues/Barriers to RETURNING to current housing:    Potential  Assistance needed at discharge: (P) N/A            Potential DME:    Patient expects to discharge to: (P) House  Plan for transportation at discharge: (P) Self    Financial    Payor: R / Plan: R [employer] EM[employer]YEES / Product Type: *No Product type* /     Does insurance require precert for SNF: Yes    Potential assistance Purchasing Medications: (P) No  Meds-to-Beds request: Yes      [facility] [place], [place] - [address] -  [phone] - F [phone]  [address] [address]  Phone: [phone] Fax: [phone]      Notes:    Factors facilitating achievement of predicted outcomes: Family support, Cooperative, and Pleasant    Barriers to discharge: Medical complications    Additional Case Management Notes: Plan is home independent with wife. Has support and transportation.     The Plan for Transition of Care is related to the following treatment goals of Atrial flutter (HCC) [I48.92]    IF APPLICABLE: The Patient and/or patient representative Khoa and his family were provided with a choice of provider and agrees with the discharge plan. Freedom of choice list with basic dialogue that supports the patient's individualized plan of care/goals and shares the quality data associated with the providers was provided to: (P) Patient   Patient Representative Name:       The Patient and/or Patient Representative Agree with the Discharge Plan? (P) Yes    Rosalba Alvarez RN  Case Management Department  Ph: [phone] Fax:

## 2024-07-28 NOTE — PROGRESS NOTES
Patient heart rhythm reverted to sinus bradycardia to sinus rhythm with HR 59-72 bpm, BP:89/58 spo2:95% on room air, still ongoing Amiodarone gtt running at 1mg/min. Notified Yvette Lopez NP. Care ongoing.

## 2024-07-28 NOTE — CONSULTS
Cardiology Consultation         Date:   7/28/2024  Patient name: Khoa Neal  Date of admission:  7/27/2024 11:02 PM  MRN:   [id number]  YOB: 1975    Reason for Admission: atrial flutter with RVR     Chief Complaint: chest pain and palpitations     History of present illness:     Patient with no prior cardiac hx admitted with sudden onset of palpitations, heart racing and chest pain, found to be in atrial flutter with RVR on arrival, started on cardizem infusion with drop in BP, later started on amio infusion, spontaneously converted to sinus rhythm after, currently remains in normal sinus rhythm and feeling better. Denies any chest pain or dyspnea prior to this episode.     Past Medical History:   has a past medical history of ADHD (attention deficit hyperactivity disorder), Anxiety, Chronic back pain, and Osteoarthritis.    Past Surgical History:   has a past surgical history that includes Tonsillectomy (01/01/1981).     Home Medications:    Prior to Admission medications    Medication Sig Start Date End Date Taking? Authorizing Provider   albuterol sulfate HFA (VENTOLIN HFA) 108 (90 Base) MCG/ACT inhaler Inhale 2 puffs into the lungs 4 times daily as needed for Wheezing 5/22/24   [place] APRN - CNP       Allergies:  Patient has no known allergies.    Social History:   reports that he has never smoked. He has never used smokeless tobacco. He reports that he does not currently use alcohol. He reports that he does not use drugs.     Family History:      REVIEW OF SYSTEMS:    Constitutional: there has been no unanticipated weight loss. No change in functional capacity.     Eyes: No visual changes or diplopia.   ENT: No Headaches, hearing loss or vertigo. No mouth sores or sore throat.  Cardiovascular: as described in HPI   Respiratory: No hx of productive cough, pleuritic chest pain   Gastrointestinal: No abdominal pain, appetite loss, blood in stools. No change in bowel

## 2024-07-28 NOTE — ED NOTES
The following labs were labeled with appropriate pt sticker and tubed to lab:     [x] Blue     [x] Lavender   [] on ice  [x] Green/yellow  [x] Green/black [] on ice  [] Aaron  [] on ice  [x] Yellow  [] Red  [] Pink  [] Type/ Screen  [] ABG  [] VBG    [] COVID-19 swab    [] Rapid  [] PCR  [] Flu swab  [] Peds Viral Panel     [] Urine Sample  [] Fecal Sample  [] Pelvic Cultures  [] Blood Cultures  [] X 2  [] STREP Cultures  [] Wound Cultures

## 2024-07-28 NOTE — ED PROVIDER NOTES
[facility] ED  Emergency Department  Emergency Medicine Sign-out   Care of Khoa Neal was assumed from Dr. Contreras and is being seen for Tachycardia (150's) and Dizziness  .  The patient's initial evaluation and plan have been discussed with the prior provider who initially evaluated the patient.     EMERGENCY DEPARTMENT COURSE / MEDICAL DECISION MAKING:       MEDICATIONS GIVEN:  Orders Placed This Encounter   Medications    DISCONTD: dilTIAZem injection 10 mg    DISCONTD: dilTIAZem 125 mg in sodium chloride 0.9 % 125 mL infusion     Order Specific Question:   Titrate Infusion?     Answer:   Yes     Order Specific Question:   Initial Infusion Dose:     Answer:   5 mg/hr     Order Specific Question:   Goal of Therapy is:     Answer:   HR less than 120 bpm     Order Specific Question:   Contact Provider if:     Answer:   SBP less than 90 mmHg     Order Specific Question:   Contact Provider if:     Answer:   HR less than 60 bpm     Order Specific Question:   HOLD for     Answer:   SBP less than 90 mmHg     Order Specific Question:   HOLD for     Answer:   HR less than 60 bpm    dilTIAZem injection 20 mg    DISCONTD: dilTIAZem 125 mg in sodium chloride 0.9 % 125 mL infusion     Order Specific Question:   Titrate Infusion?     Answer:   Yes     Order Specific Question:   Initial Infusion Dose:     Answer:   2.5 mg/hr     Order Specific Question:   Goal of Therapy is:     Answer:   HR less than 120 bpm     Order Specific Question:   Contact Provider if:     Answer:   SBP less than 90 mmHg     Order Specific Question:   Contact Provider if:     Answer:   HR less than 60 bpm     Order Specific Question:   HOLD for     Answer:   SBP less than 90 mmHg     Order Specific Question:   HOLD for     Answer:   HR less than 60 bpm    sodium chloride 0.9 % bolus 1,000 mL    sodium chloride 0.9 % bolus 1,000 mL    amiodarone (CORDARONE) 450 mg in dextrose 5 % 250 mL infusion       LABS / RADIOLOGY:     Labs  Reviewed   COMPREHENSIVE METABOLIC PANEL - Abnormal; Notable for the following components:       Result Value    BUN 26 (*)     Creatinine 1.4 (*)     All other components within normal limits   TROPONIN - Abnormal; Notable for the following components:    Troponin, High Sensitivity 27 (*)     All other components within normal limits   TROPONIN - Abnormal; Notable for the following components:    Troponin, High Sensitivity 29 (*)     All other components within normal limits   BRAIN NATRIURETIC PEPTIDE - Abnormal; Notable for the following components:    Pro- (*)     All other components within normal limits   CBC WITH AUTO DIFFERENTIAL   TSH WITH REFLEX   D-DIMER, QUANTITATIVE       XR CHEST PORTABLE    Result Date: 7/28/2024  EXAMINATION: ONE XRAY VIEW OF THE CHEST 7/28/2024 12:41 am COMPARISON: None. HISTORY: ORDERING SYSTEM PROVIDED HISTORY: chest pain TECHNOLOGIST PROVIDED HISTORY: chest pain FINDINGS: The heart is normal in size.  There is no focal pulmonary consolidation. There is mild left basilar linear atelectasis/scarring.  There is no pleural effusion or pneumothorax.  The visualized bones are unremarkable.     No acute cardiopulmonary abnormality is identified.       RECENT VITALS:     Temp: 98 °F (36.7 °C),  Pulse: (!) 150, Respirations: 10, BP: 93/72, SpO2: 94 %      This patient is a 49 y.o. Male with palpitations.  Patient states his symptoms started at 1 PM.  Patient was given Cardizem bolus and drip, then it was stopped for hypotension.  Cardiology recommending amnio drip.  Admitted to [facility].  EKG showed atrial flutter.       ED Course as of 07/28/24 0320   Sun Jul 28, 2024   0123 Troponin(!):    Troponin, High Sensitivity 29(!) [MS]   0123 Comprehensive Metabolic Panel(!):    Sodium 141   Potassium 4.2   Chloride 103   CARBON DIOXIDE 26   Anion Gap 12   Glucose 87   BUN,BUNPL 26(!)   Creatinine 1.4(!)   Est, Glom Filt Rate 64   Calcium 9.9   Total Protein 6.9   Albumin 4.6

## 2024-07-28 NOTE — PLAN OF CARE
Problem: Discharge Planning  Goal: Discharge to home or other facility with appropriate resources  Outcome: Progressing  Flowsheets (Taken 7/28/2024 0800)  Discharge to home or other facility with appropriate resources: Identify barriers to discharge with patient and caregiver     Problem: Pain  Goal: Verbalizes/displays adequate comfort level or baseline comfort level  Outcome: Progressing

## 2024-07-29 ENCOUNTER — APPOINTMENT (OUTPATIENT)
Age: 49
DRG: 309 | End: 2024-07-29
Attending: INTERNAL MEDICINE
Payer: COMMERCIAL

## 2024-07-29 VITALS
OXYGEN SATURATION: 94 % | DIASTOLIC BLOOD PRESSURE: 69 MMHG | TEMPERATURE: 98.2 F | HEART RATE: 66 BPM | BODY MASS INDEX: 24.38 KG/M2 | HEIGHT: 74 IN | WEIGHT: 190 LBS | SYSTOLIC BLOOD PRESSURE: 114 MMHG | RESPIRATION RATE: 14 BRPM

## 2024-07-29 LAB
BNP SERPL-MCNC: 261 PG/ML (ref 0–300)
ECHO AO ASC DIAM: 2.9 CM
ECHO AO ASCENDING AORTA INDEX: 1.36 CM/M2
ECHO AO ROOT DIAM: 2.8 CM
ECHO AO ROOT INDEX: 1.31 CM/M2
ECHO AV AREA PEAK VELOCITY: 2.6 CM2
ECHO AV AREA VTI: 2.4 CM2
ECHO AV AREA/BSA PEAK VELOCITY: 1.2 CM2/M2
ECHO AV AREA/BSA VTI: 1.1 CM2/M2
ECHO AV MEAN GRADIENT: 2 MMHG
ECHO AV MEAN VELOCITY: 0.8 M/S
ECHO AV PEAK GRADIENT: 4 MMHG
ECHO AV PEAK VELOCITY: 1 M/S
ECHO AV VELOCITY RATIO: 1
ECHO AV VTI: 21.9 CM
ECHO BSA: 2.12 M2
ECHO EST RA PRESSURE: 8 MMHG
ECHO LA AREA 2C: 22.8 CM2
ECHO LA AREA 4C: 16.1 CM2
ECHO LA DIAMETER INDEX: 1.6 CM/M2
ECHO LA DIAMETER: 3.4 CM
ECHO LA MAJOR AXIS: 6.5 CM
ECHO LA MINOR AXIS: 6.1 CM
ECHO LA TO AORTIC ROOT RATIO: 1.21
ECHO LA VOL BP: 51 ML (ref 18–58)
ECHO LA VOL MOD A2C: 74 ML (ref 18–58)
ECHO LA VOL MOD A4C: 34 ML (ref 18–58)
ECHO LA VOL/BSA BIPLANE: 24 ML/M2 (ref 16–34)
ECHO LA VOLUME INDEX MOD A2C: 35 ML/M2 (ref 16–34)
ECHO LA VOLUME INDEX MOD A4C: 16 ML/M2 (ref 16–34)
ECHO LV E' LATERAL VELOCITY: 11 CM/S
ECHO LV E' SEPTAL VELOCITY: 7 CM/S
ECHO LV EDV A2C: 57 ML
ECHO LV EDV A4C: 78 ML
ECHO LV EDV INDEX A4C: 37 ML/M2
ECHO LV EDV NDEX A2C: 27 ML/M2
ECHO LV EJECTION FRACTION A2C: 50 %
ECHO LV EJECTION FRACTION A4C: 50 %
ECHO LV EJECTION FRACTION BIPLANE: 51 % (ref 55–100)
ECHO LV ESV A2C: 28 ML
ECHO LV ESV A4C: 39 ML
ECHO LV ESV INDEX A2C: 13 ML/M2
ECHO LV ESV INDEX A4C: 18 ML/M2
ECHO LV FRACTIONAL SHORTENING: 24 % (ref 28–44)
ECHO LV INTERNAL DIMENSION DIASTOLE INDEX: 2.3 CM/M2
ECHO LV INTERNAL DIMENSION DIASTOLIC: 4.9 CM (ref 4.2–5.9)
ECHO LV INTERNAL DIMENSION SYSTOLIC INDEX: 1.74 CM/M2
ECHO LV INTERNAL DIMENSION SYSTOLIC: 3.7 CM
ECHO LV IVSD: 1.1 CM (ref 0.6–1)
ECHO LV MASS 2D: 200.5 G (ref 88–224)
ECHO LV MASS INDEX 2D: 94.1 G/M2 (ref 49–115)
ECHO LV POSTERIOR WALL DIASTOLIC: 1.1 CM (ref 0.6–1)
ECHO LV RELATIVE WALL THICKNESS RATIO: 0.45
ECHO LVOT AREA: 2.8 CM2
ECHO LVOT AV VTI INDEX: 0.85
ECHO LVOT DIAM: 1.9 CM
ECHO LVOT MEAN GRADIENT: 2 MMHG
ECHO LVOT PEAK GRADIENT: 4 MMHG
ECHO LVOT PEAK VELOCITY: 1 M/S
ECHO LVOT STROKE VOLUME INDEX: 24.9 ML/M2
ECHO LVOT SV: 53 ML
ECHO LVOT VTI: 18.7 CM
ECHO MV A VELOCITY: 0.69 M/S
ECHO MV AREA VTI: 1.6 CM2
ECHO MV E DECELERATION TIME (DT): 170 MS
ECHO MV E VELOCITY: 0.86 M/S
ECHO MV E/A RATIO: 1.25
ECHO MV E/E' LATERAL: 7.82
ECHO MV E/E' RATIO (AVERAGED): 10.05
ECHO MV E/E' SEPTAL: 12.29
ECHO MV LVOT VTI INDEX: 1.78
ECHO MV MAX VELOCITY: 1.1 M/S
ECHO MV MEAN GRADIENT: 2 MMHG
ECHO MV MEAN VELOCITY: 0.6 M/S
ECHO MV PEAK GRADIENT: 5 MMHG
ECHO MV VTI: 33.2 CM
ECHO PV MAX VELOCITY: 0.7 M/S
ECHO PV PEAK GRADIENT: 2 MMHG
ECHO RIGHT VENTRICULAR SYSTOLIC PRESSURE (RVSP): 28 MMHG
ECHO RV FREE WALL PEAK S': 11 CM/S
ECHO RV INTERNAL DIMENSION: 3.9 CM
ECHO RV TAPSE: 1.8 CM (ref 1.7–?)
ECHO TV REGURGITANT MAX VELOCITY: 2.21 M/S
ECHO TV REGURGITANT PEAK GRADIENT: 20 MMHG
INR PPP: 1
MAGNESIUM SERPL-MCNC: 2.1 MG/DL (ref 1.6–2.6)
PROTHROMBIN TIME: 13.3 SEC (ref 11.7–14.9)
TSH SERPL DL<=0.05 MIU/L-ACNC: 1.35 UIU/ML (ref 0.27–4.2)

## 2024-07-29 PROCEDURE — 6360000002 HC RX W HCPCS: Performed by: NURSE PRACTITIONER

## 2024-07-29 PROCEDURE — 83735 ASSAY OF MAGNESIUM: CPT

## 2024-07-29 PROCEDURE — 99232 SBSQ HOSP IP/OBS MODERATE 35: CPT | Performed by: INTERNAL MEDICINE

## 2024-07-29 PROCEDURE — 6370000000 HC RX 637 (ALT 250 FOR IP): Performed by: INTERNAL MEDICINE

## 2024-07-29 PROCEDURE — 93306 TTE W/DOPPLER COMPLETE: CPT | Performed by: INTERNAL MEDICINE

## 2024-07-29 PROCEDURE — 84443 ASSAY THYROID STIM HORMONE: CPT

## 2024-07-29 PROCEDURE — 85610 PROTHROMBIN TIME: CPT

## 2024-07-29 PROCEDURE — 2580000003 HC RX 258: Performed by: NURSE PRACTITIONER

## 2024-07-29 PROCEDURE — 83880 ASSAY OF NATRIURETIC PEPTIDE: CPT

## 2024-07-29 PROCEDURE — 36415 COLL VENOUS BLD VENIPUNCTURE: CPT

## 2024-07-29 PROCEDURE — 93306 TTE W/DOPPLER COMPLETE: CPT

## 2024-07-29 PROCEDURE — 99239 HOSP IP/OBS DSCHRG MGMT >30: CPT | Performed by: INTERNAL MEDICINE

## 2024-07-29 RX ORDER — METOPROLOL SUCCINATE 25 MG/1
25 TABLET, EXTENDED RELEASE ORAL DAILY
Qty: 90 TABLET | Refills: 1 | Status: SHIPPED | OUTPATIENT
Start: 2024-07-29

## 2024-07-29 RX ORDER — ASPIRIN 81 MG/1
81 TABLET ORAL DAILY
Qty: 90 TABLET | Refills: 0 | Status: SHIPPED | OUTPATIENT
Start: 2024-07-29

## 2024-07-29 RX ADMIN — METOPROLOL TARTRATE 12.5 MG: 25 TABLET ORAL at 08:33

## 2024-07-29 RX ADMIN — ENOXAPARIN SODIUM 90 MG: 100 INJECTION SUBCUTANEOUS at 08:33

## 2024-07-29 RX ADMIN — SODIUM CHLORIDE, PRESERVATIVE FREE 10 ML: 5 INJECTION INTRAVENOUS at 08:34

## 2024-07-29 RX ADMIN — FAMOTIDINE 20 MG: 20 TABLET, FILM COATED ORAL at 08:33

## 2024-07-29 RX ADMIN — SODIUM CHLORIDE: 9 INJECTION, SOLUTION INTRAVENOUS at 08:39

## 2024-07-29 ASSESSMENT — PAIN SCALES - GENERAL
PAINLEVEL_OUTOF10: 0

## 2024-07-29 NOTE — PROGRESS NOTES
Cardiology Progress Note                     Date:   7/29/2024  Patient name: Khoa Neal  Date of admission:  7/27/2024 11:02 PM  MRN:   [id number]  YOB: 1975  PCP: [place]a [place] APRN - CNP    Reason for Admission:  atrial flutter     Subjective:       There were no acute events overnight, remained hemodynamically stable, denies chest pain, dyspnea, orthopnea or palpitations. No further episodes of atrial flutter. HR stable. Tolerating Lopressor.       Scheduled Meds:   sodium chloride flush  5-40 mL IntraVENous 2 times per day    enoxaparin  1 mg/kg SubCUTAneous BID    famotidine  20 mg Oral Daily    metoprolol tartrate  12.5 mg Oral BID       Continuous Infusions:   sodium chloride      sodium chloride 75 mL/hr at 07/29/24 0839       Labs:     CBC:   Recent Labs     07/27/24  2338   WBC 8.9   HGB 15.6        BMP:    Recent Labs     07/27/24  2338      K 4.2      CO2 26   BUN 26*   CREATININE 1.4*   GLUCOSE 87     Hepatic:   Recent Labs     07/27/24  2338   AST 34   ALT 18   BILITOT 0.3   ALKPHOS 65     Troponin: No results for input(s): \"TROPONINI\" in the last 72 hours.  BNP: No results for input(s): \"BNP\" in the last 72 hours.  Lipids:   Recent Labs     07/27/24  2338   CHOL 180   HDL 46     INR:   Recent Labs     07/29/24  0856   INR 1.0         Objective:     Vitals: /84   Pulse 70   Temp 98.2 °F (36.8 °C) (Oral)   Resp 14   Ht 1.88 m (6' 2\")   Wt 86.2 kg (190 lb)   SpO2 94%   BMI 24.39 kg/m²     General appearance: awake, alert, in no apparent respiratory distress   HEENT: Head: Normocephalic, no lesions, without obvious abnormality  Neck: no JVD  Lungs: clear to auscultation bilaterally, no basilar rales, no wheezing   Heart: regular rate and rhythm, S1, S2 normal, no murmur, click, rub or gallop  Abdomen: soft, non-tender; bowel sounds normal  Extremities: No LE edema  Neurologic: Mental status: Alert, oriented. Motor and sensory not done.        Cardiac testing:     EKG : atrial flutter with 2: 1 AV conduction       Echocardiogram: Reviewed.         Impression:   New onset atrial flutter with RVR, paroxysmal, CHADS-VASc 0  Elevated troponins, minimal, likley type II from above   Preserved LVEF   Hypotension, resolved   ADHD  Anxiety   Chronic back pain   ALIS    Patient Active Problem List:     Arthritis     Cervical segment dysfunction     Chronic low back pain     Chronic pain of right ankle     Degenerative lumbar disc     Essential fatty acid (efa) deficiency     Gastroesophageal reflux disease     High gamma glutamyl transferase (GGT)     Hyperlipidemia     Intestinal dysbiosis     Lipoma     Obstructive sleep apnea     Osteochondral talar dome lesion     Somatic dysfunction of sacral region     Atrial flutter (HCC)     Elevated troponin     Arterial hypotension        Plan:   Patient is feeling better and in sinus rhythm. No chest pain or dyspnea.  TTE reviewed and shows low normal LVEF ~ 50%. No significant valvular stenosis or regurgitation noted.   Will change lopressor to Toprol XL 25 mg QD   ASA EC 81 mg qd  OK to dc home with OP follow up in 3-4 weeks.       Discussed with patient and Dr Grey.       Sharan Lozada MD [facility]

## 2024-07-29 NOTE — CARE COORDINATION
TRANSITIONAL CARE/DISCHARGE PLANNING ONGOING EVALUATION      Reason for Admission: Atrial flutter (HCC) [I48.92]     Hospital day:1    Patient goals/Transitional Plan:    Spoke with patient about transition and discharge planning, Plan remains home independent, has ride no needs identified.         Readmission Risk              Risk of Unplanned Readmission:  10

## 2024-07-29 NOTE — PROGRESS NOTES
[facility]  Office: [phone]  Johnathon Cruz DO, Eduardo Richard DO, Antonio Galicia DO, Gabriel Campbell DO, Jayesh Kelly MD, Mirta Pastor MD, Lorraine Dykes MD, Yin Garcia MD,  Lexa Licona MD, Aretha Blood MD, Maribell Helm MD,  Gillian Douglas DO, Sabrina Bhandari MD, Yobany Quintero MD, Leo Cruz DO, Mariya Grande MD,  Oscar Bergeron DO, Miladis Garrido MD, Nehal Grey MD, Simona Alcala MD, Luis Pierre MD,  Rafi Costa MD, Fatuma Santana MD, Wisam Akins MD, Adonis Gastelum MD, Cristino Christianson MD, Betty Neff MD, Elian Torres DO, Victorino Leonardo DO, Alan Cowan MD,  Fan Swift MD, Shirley Waterhouse, CNP,  Jaquelin Avery CNP, Eran Lopez, CNP,  Nusrat Mcclelland, DNP, Ana Paula Rich, CNP, Sabra Tabares, CNP, Breanna Golden CNP, Yvette Cota CNP, Kathy Christianson, PA-C, Iqra Alcala PA-C, Lachelle Mcmahon, CNP, Judith Hernandez, CNP, Milena Richards, CNP, Digna Merritt, CNP, Rhea Chauhan, CNP, Erin Bernard, CNS, Bhargavi Ferrara, CNP, Margaret Maddox CNP, Tracy Schwab, CNP         [facility]   IN-PATIENT SERVICE   [facility]    Progress Note    7/29/2024    11:41 AM    Name:   Khao Neal  MRN:     [id number]     Acct:      [id number]   Room:   2008/2008-01  IP Day:  1  Admit Date:  7/27/2024 11:02 PM    PCP:   [place] APRN - CNP  Code Status:  Full Code    Subjective:     C/C:   Chief Complaint   Patient presents with    Tachycardia     150's    Dizziness     Interval History Status: improved.     Cardiology evaluated him today   They have recommended echo   Amiodarone drip was discontinued     Brief History:     Khoa Neal is a 49 y.o.  male w/ OA/LBP/DJD, chronic sinusitis w/ ALIS (s/p recent sleep study and recommended for BIPAP but pending f/u with pulmonary to discuss) who presents with Tachycardia (150's) and Dizziness   and is admitted to the hospital for the management of Atrial flutter (HCC).     Patient  IntraVENous 2 times per day    enoxaparin  1 mg/kg SubCUTAneous BID    famotidine  20 mg Oral Daily    metoprolol tartrate  12.5 mg Oral BID     Continuous Infusions:    sodium chloride      sodium chloride 75 mL/hr at 24 0839     PRN Meds: sodium chloride flush, sodium chloride, ondansetron **OR** ondansetron, polyethylene glycol, acetaminophen **OR** acetaminophen    Data:     Past Medical History:   has a past medical history of ADHD (attention deficit hyperactivity disorder), Anxiety, Chronic back pain, and Osteoarthritis.    Social History:   reports that he has never smoked. He has never used smokeless tobacco. He reports that he does not currently use alcohol. He reports that he does not use drugs.     Family History:   Family History   Problem Relation Age of Onset    Cancer Maternal Grandfather     Cancer Maternal Grandmother         Bone cancer that possibly started in lungs - age 51    Early Death Maternal Grandmother         Age 51, cancer       Vitals:  /78   Pulse 60   Temp 97.9 °F (36.6 °C) (Oral)   Resp 12   Ht 1.88 m (6' 2\")   Wt 86.2 kg (190 lb)   SpO2 95%   BMI 24.39 kg/m²   Temp (24hrs), Av.8 °F (36.6 °C), Min:97.7 °F (36.5 °C), Max:97.9 °F (36.6 °C)    No results for input(s): \"POCGLU\" in the last 72 hours.    I/O (24Hr):    Intake/Output Summary (Last 24 hours) at 2024 1141  Last data filed at 2024 0833  Gross per 24 hour   Intake 15 ml   Output 250 ml   Net -235 ml       Labs:  Hematology:  Recent Labs     24  0856   WBC 8.9  --    RBC 5.41  --    HGB 15.6  --    HCT 48.1  --    MCV 88.9  --    MCH 28.8  --    MCHC 32.4  --    RDW 12.3  --      --    MPV 11.6  --    SEDRATE 1  --    CRP <3.0  --    INR  --  1.0   DDIMER 0.32  --      Chemistry:  Recent Labs     24  0032 24  0856     --   --    K 4.2  --   --      --   --    CO2 26  --   --    GLUCOSE 87  --   --    BUN 26*  --   --    CREATININE

## 2024-07-29 NOTE — PLAN OF CARE
Problem: Discharge Planning  Goal: Discharge to home or other facility with appropriate resources  7/28/2024 2110 by Blanquita Kenyon RN  Outcome: Progressing  7/28/2024 1132 by Jing Menezes, RN  Outcome: Progressing  Flowsheets (Taken 7/28/2024 0800)  Discharge to home or other facility with appropriate resources: Identify barriers to discharge with patient and caregiver     Problem: Pain  Goal: Verbalizes/displays adequate comfort level or baseline comfort level  7/28/2024 2110 by Blanquita Kenyon, KAYLYNN  Outcome: Progressing  7/28/2024 1132 by Jing Menezes, RN  Outcome: Progressing

## 2024-07-29 NOTE — DISCHARGE SUMMARY
Discharge Summary    Date: 7/29/2024  Patient Name: Khoa Neal    YOB: 1975     Age: 49 y.o.    Admit Date: 7/27/2024  Discharge Date: 7/30/2024  Discharge Condition: Good    Admission Diagnosis  Atrial flutter (HCC) [I48.92]      Discharge Diagnosis  Principal Problem:    Atrial flutter (HCC)  Active Problems:    Elevated troponin    Arterial hypotension  Resolved Problems:    * No resolved hospital problems. *      Hospital Stay  Narrative of Hospital Course:  Khoa Neal is a 49 y.o.  male w/ OA/LBP/DJD, chronic sinusitis w/ ALIS (s/p recent sleep study and recommended for BIPAP but pending f/u with pulmonary to discuss) who presents with Tachycardia (150's) and Dizziness   and is admitted to the hospital for the management of Atrial flutter (HCC).     Patient describes symptoms that began around 1 PM while eating lunch.  He states he just felt palpitations with tachycardia.  Symptoms persisted all day long.  States that he finished work, went to the gym and worked out for approximately 28 minutes despite the tachycardia.  He did state he felt slightly dizzy but denied any significant exercise intolerance or fatigue.  States he was finally encouraged by his wife to come in with persistent tachycardia throughout the day, evening.  Patient denies chest pain.  Denies near syncope or collapse.       In ED patient noted to have heart rates in the 150s status post Cardizem 20 mg x 1 and subsequently started amiodarone drip, status post subcu Lovenox.      Patient does struggle with chronic pain syndrome with diffuse myalgias arthralgias and back pain.  Denies any acute issues.  States his pain is usually between 3-5/10.  Denies recent falls or injuries.       He did travel to [place] and returned about a month ago.  He does have remote history of distal DVT that was provoked with prior orthopedic surgery, ankle fracture in 2016, was on anticoagulation for a month.  He denies any leg pain/leg        Status: In process  LA Minor Axis                                 Date: 07/29/2024  Value: 6.1         Ref range: cm                 Status: In process  LA Major Axis                                 Date: 07/29/2024  Value: 6.5         Ref range: cm                 Status: In process  LA Area 2C                                    Date: 07/29/2024  Value: 22.8        Ref range: cm2                Status: In process  LA Area 4C                                    Date: 07/29/2024  Value: 16.1        Ref range: cm2                Status: In process  LA Volume MOD A2C                             Date: 07/29/2024  Value: 74 (A)      Ref range: 18 - 58 mL         Status: In process  LA Volume MOD A4C                             Date: 07/29/2024  Value: 34          Ref range: 18 - 58 mL         Status: In process  LA Volume BP                                  Date: 07/29/2024  Value: 51          Ref range: 18 - 58 mL         Status: In process  LA Diameter                                   Date: 07/29/2024  Value: 3.4         Ref range: cm                 Status: In process  AV Mean Velocity                              Date: 07/29/2024  Value: 0.8         Ref range: m/s                Status: In process  AV Mean Gradient                              Date: 07/29/2024  Value: 2           Ref range: mmHg               Status: In process  AV VTI                                        Date: 07/29/2024  Value: 21.9        Ref range: cm                 Status: In process  AV Peak Velocity                              Date: 07/29/2024  Value: 1.0         Ref range: m/s                Status: In process  AV Peak Gradient                              Date: 07/29/2024  Value: 4           Ref range: mmHg               Status: In process  AV Area by VTI                                Date: 07/29/2024  Value: 2.4         Ref range: cm2                Status: In process  AV Area by Peak Velocity                      Date:

## 2024-07-29 NOTE — PLAN OF CARE
Problem: Discharge Planning  Goal: Discharge to home or other facility with appropriate resources  Outcome: Completed  Flowsheets (Taken 7/29/2024 0800)  Discharge to home or other facility with appropriate resources: Arrange for needed discharge resources and transportation as appropriate     Problem: Pain  Goal: Verbalizes/displays adequate comfort level or baseline comfort level  Outcome: Completed  Flowsheets  Taken 7/29/2024 1149  Verbalizes/displays adequate comfort level or baseline comfort level: Encourage patient to monitor pain and request assistance  Taken 7/29/2024 0830  Verbalizes/displays adequate comfort level or baseline comfort level: Encourage patient to monitor pain and request assistance

## 2024-07-29 NOTE — DISCHARGE SUMMARY
Discharge Summary    Date: 7/29/2024  Patient Name: Khoa Neal    YOB: 1975     Age: 49 y.o.    Admit Date: 7/27/2024  Discharge Date: 7/29/2024  Discharge Condition: Good    Admission Diagnosis  Atrial flutter (HCC) [I48.92]      Discharge Diagnosis  Principal Problem:    Atrial flutter with rapid ventricular response (HCC)  Active Problems:    Elevated troponin    Arterial hypotension  Resolved Problems:    * No resolved hospital problems. *      Hospital Stay  Narrative of Hospital Course:  Khoa Neal is a 49 y.o.  male w/ OA/LBP/DJD, chronic sinusitis w/ ALIS (s/p recent sleep study and recommended for BIPAP but pending f/u with pulmonary to discuss) who presents with Tachycardia (150's) and Dizziness   and is admitted to the hospital for the management of Atrial flutter (HCC).     Patient describes symptoms that began around 1 PM while eating lunch.  He states he just felt palpitations with tachycardia.  Symptoms persisted all day long.  States that he finished work, went to the gym and worked out for approximately 28 minutes despite the tachycardia.  He did state he felt slightly dizzy but denied any significant exercise intolerance or fatigue.  States he was finally encouraged by his wife to come in with persistent tachycardia throughout the day, evening.  Patient denies chest pain.  Denies near syncope or collapse.       In ED patient noted to have heart rates in the 150s status post Cardizem 20 mg x 1 and subsequently started amiodarone drip, status post subcu Lovenox.      Patient does struggle with chronic pain syndrome with diffuse myalgias arthralgias and back pain.  Denies any acute issues.  States his pain is usually between 3-5/10.  Denies recent falls or injuries.       He did travel to [place] and returned about a month ago.  He does have remote history of distal DVT that was provoked with prior orthopedic surgery, ankle fracture in 2016, was on anticoagulation for a month.  He  Yellow             Status: Final  Turbidity UA                                  Date: 07/28/2024  Value: Clear       Ref range: Clear              Status: Final  Glucose, Ur                                   Date: 07/28/2024  Value: NEGATIVE    Ref range: NEGATIVE mg/dL     Status: Final  Bilirubin, Urine                              Date: 07/28/2024  Value: NEGATIVE    Ref range: NEGATIVE           Status: Final  Ketones, Urine                                Date: 07/28/2024  Value: NEGATIVE    Ref range: NEGATIVE mg/dL     Status: Final  Specific [place], UA                          Date: 07/28/2024  Value: 1.024       Ref range: 1.005 - 1.030      Status: Final  Urine Hgb                                     Date: 07/28/2024  Value: NEGATIVE    Ref range: NEGATIVE           Status: Final  pH, Urine                                     Date: 07/28/2024  Value: 6.0         Ref range: 5.0 - 8.0          Status: Final  Protein, UA                                   Date: 07/28/2024  Value: NEGATIVE    Ref range: NEGATIVE mg/dL     Status: Final  Urobilinogen, Urine                           Date: 07/28/2024  Value: Normal      Ref range: 0.0 - 1.0 EU/dL    Status: Final  Nitrite, Urine                                Date: 07/28/2024  Value: NEGATIVE    Ref range: NEGATIVE           Status: Final  Leukocyte Esterase, Urine                     Date: 07/28/2024  Value: NEGATIVE    Ref range: NEGATIVE           Status: Final  Comment                                       Date: 07/28/2024  Value: Microscopic exam not performed based on chemical *                       Status: Final  CRP                                           Date: 07/27/2024  Value: <3.0        Ref range: 0.0 - 5.0 mg/L     Status: Final  Sed Rate, Automated                           Date: 07/27/2024  Value: 1           Ref range: 0 - 15 mm/Hr       Status: Final  Vit D, 25-Hydroxy                             Date: 07/27/2024  Value: 30.8        Ref

## 2024-07-30 ENCOUNTER — CARE COORDINATION (OUTPATIENT)
Dept: OTHER | Facility: CLINIC | Age: 49
End: 2024-07-30

## 2024-07-30 LAB
EKG ATRIAL RATE: 300 BPM
EKG P AXIS: -94 DEGREES
EKG Q-T INTERVAL: 246 MS
EKG QRS DURATION: 140 MS
EKG QTC CALCULATION (BAZETT): 388 MS
EKG R AXIS: 88 DEGREES
EKG T AXIS: -96 DEGREES
EKG VENTRICULAR RATE: 150 BPM

## 2024-07-30 NOTE — CARE COORDINATION
Care Transitions Note    Initial Call - Call within 2 business days of discharge: Yes    Patient Current Location:  [place]    Care Transition Nurse contacted the patient by telephone to perform post hospital discharge assessment, verified name and  as identifiers. Provided introduction to self, and explanation of the Care Transition Nurse role.     Patient: Khoa Neal    Patient : 1975   MRN: [id number]    Reason for Admission: atrial flutter  Discharge Date: 24  RURS: Readmission Risk Score: 6.2      Last Discharge Facility       Date Complaint Diagnosis Description Type Department Provider    24 Tachycardia; Dizziness Atrial flutter, unspecified type (HCC) ED to Hosp-Admission (Discharged) (ADMITTED) [facility] Nehal Grey MD; Deann Olivier..            Was this an external facility discharge? No    Additional needs identified to be addressed with provider   No needs identified             Method of communication with provider: none.    Patients top risk factors for readmission: medical condition-atrial flutter    Interventions to address risk factors:   Review of patient management of conditions/medications: patient states he is feeling well. Has medications filled. Patient states he will call to schedule follow up appointments.    Care Summary Note: patient states he is doing well. Denies chest pain, shortness of breath, palpitations. States he is taking aspirin 81 mg and metoprolol. States he is eating and drinking well. States he was referred to EP and will call to make this appointment. Patient states he will follow up with PCP if needed. Patient states he is doing well and has no care management needs at this time.    Care Transition Nurse reviewed discharge instructions and red flags with patient. The patient was given an opportunity to ask questions; no further questions or concerns at this time.. The patient verbalized understanding.   Were discharge instructions available to

## 2024-07-30 NOTE — PROGRESS NOTES
CLINICAL PHARMACY NOTE: MEDS TO BEDS    Total # of Prescriptions Filled: 2   The following medications were delivered to the patient:  Metoprolol  aspirin    Additional Documentation:

## 2024-08-02 NOTE — PROGRESS NOTES
Physician Progress Note      PATIENT:               CHIP GUTIERREZ  CSN #:                  [id number]  :                       1975  ADMIT DATE:       2024 11:02 PM  DISCH DATE:        2024 6:13 PM  RESPONDING  PROVIDER #:        Nehal SONG MD          QUERY TEXT:    Pt admitted with atrial flutter  with rapid ventricular response.  Noted   documentation of on Elevated troponin likely component of demand ischemia   associate with tachycardia. by ordered AN IM H&P on  and elevated   troponins, minimal, likely type II from atrial flutter with RVR noted in   cardiology consult on  and .  If possible, please document in progress   notes and discharge summary:    The medical record reflects the following:  Risk Factors: Anxiety chronic pain ALIS  Clinical Indicators: proBNP [id number], troponin -, /70 HR 68 RR 14   SpO2 96% on room air, EKG does demonstrate nonspecific ST-T changes however   clinically patient denies any symptoms suggesting ischemic heart disease.     cardiology note patient and NSR no chest pain TTE reviewed shows  low   normal LVEF    50%. No significant valvular stenosis or regurgitation noted.   echo:-  Left Ventricle: Normal left ventricular systolic function with a   visually estimated EF of 50 - 55%. EF by 2D Simpsons Biplane is 51%. Left   ventricle size is normal. Normal wall thickness. There is possible mild   anterior hypokinesis noted in certain views. Normal diastolic function.  -  Mitral Valve: Trace regurgitation.  -  Tricuspid Valve: Trace regurgitation. Normal RVSP. The estimated RVSP is 28   mmHg.  -  Pulmonic Valve: Trace regurgitation.  Treatment: Cardiology consult, echo  amiodarone infusion D/c'd and  Lopressor   started    Thank you for your time,  Yany Gomez RN, BSN CDS  [email or web address]  Options provided:  -- Type II MI confirmed present on admission  -- Type II MI ruled out  -- I defer to Cardiologist consultant regarding

## 2024-08-07 ENCOUNTER — INITIAL CONSULT (OUTPATIENT)
Age: 49
End: 2024-08-07

## 2024-08-07 VITALS
DIASTOLIC BLOOD PRESSURE: 64 MMHG | HEIGHT: 74 IN | SYSTOLIC BLOOD PRESSURE: 111 MMHG | OXYGEN SATURATION: 96 % | TEMPERATURE: 97.9 F | WEIGHT: 203 LBS | HEART RATE: 66 BPM | BODY MASS INDEX: 26.05 KG/M2

## 2024-08-07 DIAGNOSIS — D68.69 SECONDARY HYPERCOAGULABLE STATE (HCC): ICD-10-CM

## 2024-08-07 DIAGNOSIS — I50.22 CHRONIC SYSTOLIC (CONGESTIVE) HEART FAILURE (HCC): ICD-10-CM

## 2024-08-07 DIAGNOSIS — I48.3 TYPICAL ATRIAL FLUTTER (HCC): Primary | ICD-10-CM

## 2024-08-07 ASSESSMENT — ENCOUNTER SYMPTOMS
ALLERGIC/IMMUNOLOGIC NEGATIVE: 1
STRIDOR: 1
EYES NEGATIVE: 1
GASTROINTESTINAL NEGATIVE: 1

## 2024-08-07 NOTE — PROGRESS NOTES
[facility] CARDIAC ELECTROPHYSIOLOGY  [address], [address]  [facility]  [address]    Date of Visit:  2024  Patient Name: Khoa Neal   Patient :  1975   Referring By:  No ref. provider found     CHIEF COMPLAINT/HPI:     Khoa Neal is a 49 y.o. 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  He was in sustained palpitations for about 10 hours before he came into the emergency room.  He was found to be in narrow QRS tachycardia.  The impression that it was atrial flutter.  IV Cardizem and IV amiodarone was used before it terminates into sinus rhythm.  It was symptomatic.  It was not hemodynamically significant.  The patient thinks it was 1 type of arrhythmias to hold event.    The patient insists his first and only event of sustained arrhythmias.  He feels every now and then some fluttering in his chest but nothing even similar to what he encountered before.    The workup showed no significant etiology.  The gentleman is a nondrinker non-smoker very active otherwise healthy.    Years ago for some type of chest pain he had an EKG and was admitted to the [facility] cardiac workup including heart catheterization was done.  They concluded according to the patient that his ECG abnormality is there and basically ST segment T wave changes and they advised him to always keep a copy of it with him in case he is in emergency room to compare.    Years ago after an ankle surgery he developed DVT for which she needed to be on oral anticoagulants for 1 month.    No family history known of atrial flutter or fibrillation.  Parents are alive.  Brothers and sisters are healthy.  No mentioning of ECG abnormality in the family.  The patient himself has no kids.    There is an uncle

## 2024-08-14 ENCOUNTER — HOSPITAL ENCOUNTER (OUTPATIENT)
Age: 49
Discharge: HOME | End: 2024-08-16
Attending: SPECIALIST
Payer: COMMERCIAL

## 2024-08-14 DIAGNOSIS — I48.3 TYPICAL ATRIAL FLUTTER (HCC): ICD-10-CM

## 2024-08-14 PROCEDURE — 93242 EXT ECG>48HR<7D RECORDING: CPT

## 2024-08-27 PROBLEM — R79.89 ELEVATED TROPONIN: Status: RESOLVED | Noted: 2024-07-28 | Resolved: 2024-08-27

## 2024-09-11 ENCOUNTER — OFFICE VISIT (OUTPATIENT)
Age: 49
End: 2024-09-11

## 2024-09-11 VITALS
SYSTOLIC BLOOD PRESSURE: 115 MMHG | OXYGEN SATURATION: 98 % | TEMPERATURE: 97.2 F | DIASTOLIC BLOOD PRESSURE: 78 MMHG | HEIGHT: 74 IN | BODY MASS INDEX: 25.8 KG/M2 | WEIGHT: 201 LBS | HEART RATE: 77 BPM

## 2024-09-11 DIAGNOSIS — I48.92 ATRIAL FLUTTER WITH RAPID VENTRICULAR RESPONSE (HCC): ICD-10-CM

## 2024-09-11 DIAGNOSIS — R00.2 PALPITATIONS: Primary | ICD-10-CM

## 2024-09-11 PROBLEM — I50.22 CHRONIC SYSTOLIC (CONGESTIVE) HEART FAILURE (HCC): Status: RESOLVED | Noted: 2024-08-07 | Resolved: 2024-09-11

## 2024-09-11 PROBLEM — I95.9 ARTERIAL HYPOTENSION: Status: RESOLVED | Noted: 2024-07-28 | Resolved: 2024-09-11

## 2025-06-12 LAB
CHOLEST SERPL-MCNC: 187 MG/DL (ref 0–199)
CHOLESTEROL/HDL RATIO: 3.4
GLUCOSE SERPL-MCNC: 82 MG/DL (ref 74–99)
HDLC SERPL-MCNC: 55 MG/DL
LDLC SERPL CALC-MCNC: 124 MG/DL (ref 0–100)
PATIENT FASTING?: YES
TRIGL SERPL-MCNC: 41 MG/DL
VLDLC SERPL CALC-MCNC: 8 MG/DL (ref 1–30)